# Patient Record
Sex: FEMALE | Race: WHITE | NOT HISPANIC OR LATINO | URBAN - METROPOLITAN AREA
[De-identification: names, ages, dates, MRNs, and addresses within clinical notes are randomized per-mention and may not be internally consistent; named-entity substitution may affect disease eponyms.]

---

## 2017-01-04 ENCOUNTER — OFFICE VISIT - RIVER FALLS (OUTPATIENT)
Dept: FAMILY MEDICINE | Facility: CLINIC | Age: 22
End: 2017-01-04

## 2022-02-11 VITALS
SYSTOLIC BLOOD PRESSURE: 108 MMHG | WEIGHT: 149 LBS | TEMPERATURE: 97.6 F | DIASTOLIC BLOOD PRESSURE: 72 MMHG | HEART RATE: 76 BPM

## 2022-02-16 NOTE — PROGRESS NOTES
Patient:   KRISTINA GARZA            MRN: [id number]            FIN: [id number]               Age:   21 years     Sex:  Female     :  1995   Associated Diagnoses:   Contraceptive management; Immunization due   Author:   Tawnya Patrick      Subjective   Chief complaint 2017 10:04 AM CST    anxiety f/u. Stopped taking Lexapro in August. Moving to [place] this summer so would like ocp extended/refilled as well.  .        Health Status   Allergies:    Allergic Reactions (Selected)  No known allergies   Medications:  (Selected)   Prescriptions  Prescribed  Balziva 0.4 mg-35 mcg oral tablet: 1 tab(s), po, daily, # 84 tab(s), 4 Refill(s), Type: Maintenance, [facility], 1 tab(s) po daily  Documented Medications  Documented  Retin-A: top, hs, 0 Refill(s), Type: Maintenance   Problem list:    All Problems  Anxiety, generalized / SNOMED CT [id number] / Confirmed       PPC with mother, she was on my schedule for annual exam but this had been done in summer 2016  she is requesting a refill of her COCs to last one year from today as will be staying at [facility] for an entire year  she has been sexually active in past but is not currently  she denies needing STD screening, is off lexapro and does not want to discuss alternative treatment         Objective   Vital Signs   2017 10:04 AM CST Temperature Tympanic 97.6 DegF  LOW    Peripheral Pulse Rate 76 bpm    Pulse Site Radial artery    HR Method Manual    Systolic Blood Pressure 108 mmHg    Diastolic Blood Pressure 72 mmHg    Mean Arterial Pressure 84 mmHg    BP Site Right arm    BP Method Manual      General:  Alert and oriented, No acute distress.    Eye:  Pupils are equal, round and reactive to light.    HENT:  Normocephalic.    Neurologic:  Alert, Oriented, Normal sensory, Normal motor function.    Psychiatric:  Cooperative, Appropriate mood & affect, Normal judgment.       Impression and Plan   Assessment and Plan:           Diagnosis: Contraceptive management (ICO07-EG Z30.9), Immunization due ([id number] Z23).         Course: discussed STDs and communicability  discussed contraceptive management  pap due at 23 yoa.    Orders      (Selected)   Prescriptions  Prescribed  Balziva 0.4 mg-35 mcg oral tablet: 1 tab(s), po, daily, # 84 tab(s), 4 Refill(s), Type: Maintenance, Pharmacy: [facility], 1 tab(s) po daily.     .